# Patient Record
Sex: MALE | URBAN - METROPOLITAN AREA
[De-identification: names, ages, dates, MRNs, and addresses within clinical notes are randomized per-mention and may not be internally consistent; named-entity substitution may affect disease eponyms.]

---

## 2022-05-23 ENCOUNTER — OFFICE VISIT (OUTPATIENT)
Dept: URBAN - METROPOLITAN AREA CLINIC 6 | Facility: CLINIC | Age: 62
End: 2022-05-23
Payer: COMMERCIAL

## 2022-05-23 DIAGNOSIS — H25.813 COMBINED FORMS OF AGE-RELATED CATARACT, BILATERAL: ICD-10-CM

## 2022-05-23 DIAGNOSIS — H43.313 VITREOUS MEMBRANES AND STRANDS, BILATERAL: ICD-10-CM

## 2022-05-23 DIAGNOSIS — E11.3293 DIABETES MELLITUS TYPE 2 WITH MILD NON-PROLIFERATIVE RETINOPATHY WITHOUT MACULAR EDEMA, BILATERAL: Primary | ICD-10-CM

## 2022-05-23 PROCEDURE — 92014 COMPRE OPH EXAM EST PT 1/>: CPT | Performed by: OPTOMETRIST

## 2022-05-23 ASSESSMENT — INTRAOCULAR PRESSURE
OD: 12
OS: 11

## 2022-05-23 NOTE — IMPRESSION/PLAN
Impression: Diabetes mellitus Type 2 with mild non-proliferative retinopathy without macular edema, bilateral: [id number]. Plan: Diabetes Mellitus Type II with mild Non-Proliferative Diabetic Retinopathy both eyes with absence of  macular edema - Discussed the pathophysiology of diabetes and its effect on the eye. Stressed the importance of strong glucose control. Advised of importance of scheduled dilated examinations, and to contact us immediately for any problems or concerns.